# Patient Record
(demographics unavailable — no encounter records)

---

## 2020-03-03 DIAGNOSIS — Z31.448 ENCOUNTER FOR OTHER GENETIC TESTING OF MALE FOR PROCREATIVE MANAGEMENT: Primary | ICD-10-CM

## 2020-03-06 NOTE — PROGRESS NOTES
Cesario was seen in conjunction with his partner, Karina, today at her prenatal appointment at [facility]. Cesario consented to pursuing the Foresight Carrier Screen through [facility], however, he later declined a blood draw for this [email or web address]    Alyce Devlin MS, [facility]  [facility]  [facility]  Ph: [phone]  [email or web address]